# Patient Record
Sex: MALE | Employment: STUDENT | URBAN - METROPOLITAN AREA
[De-identification: names, ages, dates, MRNs, and addresses within clinical notes are randomized per-mention and may not be internally consistent; named-entity substitution may affect disease eponyms.]

---
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
## 2023-02-16 ENCOUNTER — PATIENT OUTREACH (OUTPATIENT)
Dept: ADMINISTRATIVE | Facility: HOSPITAL | Age: 18
End: 2023-02-16
Payer: MEDICAID

## 2023-02-16 NOTE — PROGRESS NOTES
DM Report: Attempted to contact pt, spoke with pt family member he was not available, left message asking pt give us a call in regards to overdue labs recommended and follow up appt needed.

## 2023-05-19 ENCOUNTER — PATIENT OUTREACH (OUTPATIENT)
Dept: ADMINISTRATIVE | Facility: HOSPITAL | Age: 18
End: 2023-05-19
Payer: MEDICAID

## 2023-05-19 NOTE — PROGRESS NOTES
Working Dm Report:      Pt overdue for DM labs. Called to discuss scheduling. No answer, LVM. No updates found.

## 2023-05-23 ENCOUNTER — PATIENT OUTREACH (OUTPATIENT)
Dept: ADMINISTRATIVE | Facility: HOSPITAL | Age: 18
End: 2023-05-23
Payer: MEDICAID

## 2023-05-23 NOTE — PROGRESS NOTES
Diabetic Eye Exam Report: Called Pt about overdue eye exam, No answer, did not LVM due to voice mail saying this is Beth[place].